# Patient Record
Sex: MALE
[De-identification: names, ages, dates, MRNs, and addresses within clinical notes are randomized per-mention and may not be internally consistent; named-entity substitution may affect disease eponyms.]

---

## 2019-06-20 ENCOUNTER — APPOINTMENT (OUTPATIENT)
Dept: ENDOCRINOLOGY | Facility: CLINIC | Age: 79
End: 2019-06-20
Payer: MEDICARE

## 2019-06-20 VITALS — BODY MASS INDEX: 26.68 KG/M2 | HEIGHT: 67.3 IN | WEIGHT: 172 LBS

## 2019-06-20 PROBLEM — Z00.00 ENCOUNTER FOR PREVENTIVE HEALTH EXAMINATION: Status: ACTIVE | Noted: 2019-06-20

## 2019-06-20 PROCEDURE — 77080 DXA BONE DENSITY AXIAL: CPT | Mod: GA

## 2019-12-27 ENCOUNTER — APPOINTMENT (OUTPATIENT)
Dept: ENDOCRINOLOGY | Facility: CLINIC | Age: 79
End: 2019-12-27
Payer: MEDICARE

## 2019-12-27 VITALS
WEIGHT: 175 LBS | HEART RATE: 74 BPM | OXYGEN SATURATION: 98 % | BODY MASS INDEX: 26.52 KG/M2 | SYSTOLIC BLOOD PRESSURE: 140 MMHG | HEIGHT: 68 IN | DIASTOLIC BLOOD PRESSURE: 80 MMHG

## 2019-12-27 DIAGNOSIS — Z82.49 FAMILY HISTORY OF ISCHEMIC HEART DISEASE AND OTHER DISEASES OF THE CIRCULATORY SYSTEM: ICD-10-CM

## 2019-12-27 DIAGNOSIS — I10 ESSENTIAL (PRIMARY) HYPERTENSION: ICD-10-CM

## 2019-12-27 DIAGNOSIS — Z78.9 OTHER SPECIFIED HEALTH STATUS: ICD-10-CM

## 2019-12-27 DIAGNOSIS — Z85.72 PERSONAL HISTORY OF NON-HODGKIN LYMPHOMAS: ICD-10-CM

## 2019-12-27 PROCEDURE — 99204 OFFICE O/P NEW MOD 45 MIN: CPT

## 2019-12-27 RX ORDER — COLD-HOT PACK
125 MCG EACH MISCELLANEOUS
Refills: 0 | Status: ACTIVE | COMMUNITY

## 2019-12-27 RX ORDER — GINSENG 100 MG
CAPSULE ORAL
Refills: 0 | Status: ACTIVE | COMMUNITY

## 2019-12-27 RX ORDER — ASCORBIC ACID 500 MG
TABLET ORAL
Refills: 0 | Status: ACTIVE | COMMUNITY

## 2019-12-27 RX ORDER — PSYLLIUM HUSK 0.4 G
CAPSULE ORAL
Refills: 0 | Status: ACTIVE | COMMUNITY

## 2019-12-27 RX ORDER — OMEGA-3/DHA/EPA/FISH OIL 300-1000MG
CAPSULE ORAL
Refills: 0 | Status: ACTIVE | COMMUNITY

## 2019-12-27 RX ORDER — DILTIAZEM HYDROCHLORIDE 240 MG/1
240 CAPSULE, COATED, EXTENDED RELEASE ORAL
Refills: 0 | Status: ACTIVE | COMMUNITY

## 2019-12-27 RX ORDER — ASPIRIN 325 MG/1
TABLET, FILM COATED ORAL
Refills: 0 | Status: ACTIVE | COMMUNITY

## 2019-12-27 RX ORDER — VITAMIN E ACID SUCCINATE 268 MG
TABLET ORAL
Refills: 0 | Status: ACTIVE | COMMUNITY

## 2019-12-30 PROBLEM — Z85.72 HISTORY OF NON-HODGKIN'S LYMPHOMA: Status: RESOLVED | Noted: 2019-12-30 | Resolved: 2019-12-30

## 2019-12-30 PROBLEM — Z82.49 FAMILY HISTORY OF ATRIAL FIBRILLATION: Status: ACTIVE | Noted: 2019-12-30

## 2019-12-30 PROBLEM — I10 HYPERTENSION: Status: ACTIVE | Noted: 2019-12-30

## 2019-12-30 PROBLEM — Z78.9 NEVER EXERCISES: Status: ACTIVE | Noted: 2019-12-30

## 2019-12-30 LAB
25(OH)D3 SERPL-MCNC: 70.4 NG/ML
ALBUMIN SERPL ELPH-MCNC: 4.4 G/DL
ALP BLD-CCNC: 79 U/L
ALT SERPL-CCNC: 13 U/L
ANION GAP SERPL CALC-SCNC: 14 MMOL/L
AST SERPL-CCNC: 17 U/L
BILIRUB SERPL-MCNC: 0.4 MG/DL
BUN SERPL-MCNC: 22 MG/DL
CALCIUM SERPL-MCNC: 9.7 MG/DL
CALCIUM SERPL-MCNC: 9.7 MG/DL
CHLORIDE SERPL-SCNC: 104 MMOL/L
CO2 SERPL-SCNC: 25 MMOL/L
CREAT SERPL-MCNC: 1.13 MG/DL
GLUCOSE SERPL-MCNC: 77 MG/DL
PARATHYROID HORMONE INTACT: 27 PG/ML
POTASSIUM SERPL-SCNC: 4.4 MMOL/L
PROT SERPL-MCNC: 6.6 G/DL
SODIUM SERPL-SCNC: 143 MMOL/L
TSH SERPL-ACNC: 2.95 UIU/ML

## 2019-12-30 NOTE — ASSESSMENT
[Bisphosphonate Therapy] : Risks  and benefits of bisphosphonate therapy were  discussed with the patient including gastroesophageal irritation, osteonecrosis of the jaw, and atypical femur fractures, and acute phase reaction [Bisphosphonates] : The patient was instructed to take bisphosphonates on an empty stomach with a full glass of water,and wait at least 30 minutes before eating or lying down [FreeTextEntry1] : Mr. ROLLE is a 79 year old male w/ osteopenia\par \par He was told of osteoporosis in 6/2019. He has not rx in the past. H/o Non-Hodgkins lymphoma in 2014, rx w/ Rituxan. No unusual risks for osteoporosis. Outside BMD 6/2019 indicates osteopenia at sites, Meets criteria for osteoporosis based on increased risk of hip fracture acc to FRAX model. \par \par I recommend the pt start rx due to increased risk for future fx. I discussed and recommend rx with bisphosphonate therapy, including Fosamax, Actonel, Boniva, and IV Reclast. Risks and benefits of bisphosphonate therapy were discussed with the patient including minor aches & pains, gastroesophageal irritation (excluding IV Reclast), osteonecrosis of the jaw, and atypical femur fractures, and acute phase reaction. Pt would benefit from bisphosphonate therapy with the expectation of a drug holiday within 5 years. All questions were answered. Pt understands and agrees to start Actonel. Prescription sent out.\par \par Request labs sent out.\par \par f/u in 6 months

## 2019-12-30 NOTE — PHYSICAL EXAM
[Alert] : alert [No Acute Distress] : no acute distress [Well Nourished] : well nourished [Well Developed] : well developed [Normal Sclera/Conjunctiva] : normal sclera/conjunctiva [EOMI] : extra ocular movement intact [No Proptosis] : no proptosis [Normal Oropharynx] : the oropharynx was normal [Thyroid Not Enlarged] : the thyroid was not enlarged [No Thyroid Nodules] : there were no palpable thyroid nodules [No Respiratory Distress] : no respiratory distress [No Accessory Muscle Use] : no accessory muscle use [Normal Rate] : heart rate was normal  [Clear to Auscultation] : lungs were clear to auscultation bilaterally [Regular Rhythm] : with a regular rhythm [Normal S1, S2] : normal S1 and S2 [No Edema] : there was no peripheral edema [Normal Bowel Sounds] : normal bowel sounds [Not Tender] : non-tender [Soft] : abdomen soft [Post Cervical Nodes] : posterior cervical nodes [Anterior Cervical Nodes] : anterior cervical nodes [Not Distended] : not distended [Axillary Nodes] : axillary nodes [Normal] : normal and non tender [No Spinal Tenderness] : no spinal tenderness [No Stigmata of Cushings Syndrome] : no stigmata of cushings syndrome [Spine Straight] : spine straight [Normal Gait] : normal gait [Normal Strength/Tone] : muscle strength and tone were normal [No Rash] : no rash [Normal Reflexes] : deep tendon reflexes were 2+ and symmetric [No Tremors] : no tremors [Oriented x3] : oriented to person, place, and time [Acanthosis Nigricans] : no acanthosis nigricans [de-identified] : decreased truncal ht

## 2019-12-30 NOTE — HISTORY OF PRESENT ILLNESS
[Vitamin D (supplements)] : Vitamin D as a dietary supplement [Calcium (dietary)] : calcium from their regular diet [FreeTextEntry1] : Mr. ROLLE is a 79 year old male being referred today for low bone density \par \par Pt requested screening BMD , based on age,  6/2019.No h/o fractures of bone disease. He has not rx in the past. H/o Non-Hodgkins lymphoma in 2014, rx w/ Rituxan. H/o 2 cardiac ablations. H/o hernia repair. No h/o prior fx. No h/o kidney stones or calcium problems. No lung, kidney, liver, stomach problems. No neurological or psychological problems. No ulcers or bleeding. No unusual risks for osteoporosis. No h/o RT, no amiodarone or lithium use. No h/o Paget's disease. No routine exercise. Diet includes milk, yogurt, and cheese.\par Bone mineral density: 6/2019\par Lumbar spine 1-4  Not accurate because of arthritis \par Total hip   BMD: 0.693 g/cm2  T score -2.3  WHO Classification osteopenia \par Femoral neck  BMD:0.640 g/cm2  T score -2.1  WHO Classification osteopenia\par Proximal wrist  BMD:0.710 g/cm2  T score -2.0  WHO Classification osteopenia  \par FRAX 9.3/ 3.6%, consistent with osteoporosis

## 2019-12-30 NOTE — CONSULT LETTER
[Dear  ___] : Dear  [unfilled], [Consult Letter:] : I had the pleasure of evaluating your patient, [unfilled]. [Please see my note below.] : Please see my note below. [Consult Closing:] : Thank you very much for allowing me to participate in the care of this patient.  If you have any questions, please do not hesitate to contact me. [Sincerely,] : Sincerely, [FreeTextEntry2] : Jeff Flores MD\par [address]\[place] [address]\par [phone]

## 2019-12-30 NOTE — END OF VISIT
[FreeTextEntry3] : I, Malcolm Guthrie, authored this note working as a medical scribe for Dr. Dye.  12/27/2019.  1:30PM. This note was authored by the medical scribe for me. I have reviewed, edited, and revised the note as needed. I am in agreement with the exam findings, imaging findings, and treatment plan.  Cesar Dye MD

## 2020-12-30 ENCOUNTER — APPOINTMENT (OUTPATIENT)
Dept: ENDOCRINOLOGY | Facility: CLINIC | Age: 80
End: 2020-12-30

## 2021-07-30 ENCOUNTER — APPOINTMENT (OUTPATIENT)
Dept: ENDOCRINOLOGY | Facility: CLINIC | Age: 81
End: 2021-07-30
Payer: MEDICARE

## 2021-07-30 VITALS
OXYGEN SATURATION: 95 % | TEMPERATURE: 98 F | SYSTOLIC BLOOD PRESSURE: 110 MMHG | HEART RATE: 63 BPM | WEIGHT: 174 LBS | DIASTOLIC BLOOD PRESSURE: 80 MMHG | HEIGHT: 67.3 IN | BODY MASS INDEX: 26.99 KG/M2

## 2021-07-30 DIAGNOSIS — M81.0 AGE-RELATED OSTEOPOROSIS W/OUT CURRENT PATHOLOGICAL FRACTURE: ICD-10-CM

## 2021-07-30 PROCEDURE — 77080 DXA BONE DENSITY AXIAL: CPT | Mod: GA

## 2021-07-30 PROCEDURE — ZZZZZ: CPT

## 2021-07-30 PROCEDURE — 99213 OFFICE O/P EST LOW 20 MIN: CPT | Mod: 25

## 2021-07-31 NOTE — END OF VISIT
[FreeTextEntry3] : I, Malcolm Guthrie, authored this note working as a medical scribe for Dr. Dye.  07/30/2021.  2:45PM. This note was authored by the medical scribe for me. I have reviewed, edited, and revised the note as needed. I am in agreement with the exam findings, imaging findings, and treatment plan.  Cesar Dye MD

## 2021-07-31 NOTE — HISTORY OF PRESENT ILLNESS
[Risedronate (Actonel)] : Risedronate [FreeTextEntry1] : No significant interval health changes. No interval surgery, hospitalizations, fractures, or change in medications.\par \par Pt started Actonel 12/2019. Took correctly, tolerated well. No interval fx, no UGI sx, no thigh pain. No aches & pains. No heartburn. Last DDS within past 6 months. No ONJ. Pt stopped Actonel ~3/2020 because he did not renew prescription.\par \par Pt requested screening BMD , based on age,  6/2019.No h/o fractures of bone disease. He has not rx in the past. H/o Non-Hodgkins lymphoma in 2014, rx w/ Rituxan. H/o 2 cardiac ablations. H/o hernia repair. No h/o prior fx. No h/o kidney stones or calcium problems. No lung, kidney, liver, stomach problems. No neurological or psychological problems. No ulcers or bleeding. No unusual risks for osteoporosis. No h/o RT, no amiodarone or lithium use. No h/o Paget's disease. No routine exercise. Diet includes milk, yogurt, and cheese.\par Bone mineral density: 6/2019\par Lumbar spine 1-4  Not accurate because of arthritis \par Total hip   BMD: 0.693 g/cm2  T score -2.3  WHO Classification osteopenia \par Femoral neck  BMD:0.640 g/cm2  T score -2.1  WHO Classification osteopenia\par Proximal wrist  BMD:0.710 g/cm2  T score -2.0  WHO Classification osteopenia  \par FRAX 9.3/ 3.6%, consistent with osteoporosis

## 2021-07-31 NOTE — ASSESSMENT
[Bisphosphonate Therapy] : Risks and benefits of bisphosphonate therapy were  discussed with the patient including gastroesophageal irritation, osteonecrosis of the jaw, and atypical femur fractures, and acute phase reaction [Bisphosphonates] : The patient was instructed to take bisphosphonates on an empty stomach with a full glass of water,and wait at least 30 minutes before eating or lying down [FreeTextEntry1] : Mr. ROLLE is a 81 year old male w/ osteopenia\par \par He was told of osteoporosis in 6/2019. He has not rx in the past. H/o Non-Hodgkins lymphoma in 2014, rx w/ Rituxan. No unusual risks for osteoporosis. Outside BMD 6/2019 indicates osteopenia at sites, Meets criteria for osteoporosis based on increased risk of hip fracture acc to FRAX model 9.3/3.6. Patient advised for an increased risk of future fx. Options for medical therapy discussed in detail. Pt started Actonel 12/2019. Took correctly, tolerated well. No interval fx, no UGI sx, no thigh pain. No aches & pains. No heartburn. No ONJ. Pt stopped Actonel ~3/2020 because he did not renew prescription. BMD 7/2021 indicates stable osteopenia in hip and improved osteopenia in proximal radius. BMD results reviewed w/ pt.\par \par Options of therapy reviewed. Recommend pt restart Actonel as previously he tolerated this rx. Reviewed pt would be treated empirically for ~2-3 years followed by a future drug holiday for < 5 years to decrease risk of long term therapy side effects; ONJ or atypical femur fx. Risks and benefits discussed. All questions were answered. Pt understands and agrees to restart Actonel and to renew rx every 3 months. Medication instructions reviewed. Prescription sent.\par \par Pt will send recent labs to me.\par \par F/u in 1 year

## 2022-07-26 ENCOUNTER — RX RENEWAL (OUTPATIENT)
Age: 82
End: 2022-07-26

## 2022-11-10 ENCOUNTER — RX RENEWAL (OUTPATIENT)
Age: 82
End: 2022-11-10

## 2022-11-20 ENCOUNTER — RX RENEWAL (OUTPATIENT)
Age: 82
End: 2022-11-20

## 2022-11-28 ENCOUNTER — RX RENEWAL (OUTPATIENT)
Age: 82
End: 2022-11-28

## 2023-02-24 ENCOUNTER — RX RENEWAL (OUTPATIENT)
Age: 83
End: 2023-02-24

## 2023-06-12 ENCOUNTER — RX RENEWAL (OUTPATIENT)
Age: 83
End: 2023-06-12

## 2023-06-21 ENCOUNTER — RX RENEWAL (OUTPATIENT)
Age: 83
End: 2023-06-21

## 2023-09-18 ENCOUNTER — RX RENEWAL (OUTPATIENT)
Age: 83
End: 2023-09-18

## 2023-09-18 RX ORDER — RISEDRONATE SODIUM 150 MG/1
150 TABLET, FILM COATED ORAL
Qty: 1 | Refills: 0 | Status: ACTIVE | COMMUNITY
Start: 2019-12-27 | End: 1900-01-01

## 2023-09-19 ENCOUNTER — RX RENEWAL (OUTPATIENT)
Age: 83
End: 2023-09-19

## 2024-10-09 ENCOUNTER — APPOINTMENT (OUTPATIENT)
Dept: ENDOCRINOLOGY | Facility: CLINIC | Age: 84
End: 2024-10-09
Payer: MEDICARE

## 2024-10-09 VITALS — WEIGHT: 159 LBS | BODY MASS INDEX: 25.25 KG/M2 | HEIGHT: 66.4 IN

## 2024-10-09 PROCEDURE — 77080 DXA BONE DENSITY AXIAL: CPT | Mod: GA

## 2024-10-16 ENCOUNTER — APPOINTMENT (OUTPATIENT)
Dept: ENDOCRINOLOGY | Facility: CLINIC | Age: 84
End: 2024-10-16
Payer: MEDICARE

## 2024-10-16 VITALS
HEART RATE: 64 BPM | HEIGHT: 66 IN | SYSTOLIC BLOOD PRESSURE: 142 MMHG | BODY MASS INDEX: 26.68 KG/M2 | DIASTOLIC BLOOD PRESSURE: 80 MMHG | OXYGEN SATURATION: 95 % | WEIGHT: 166 LBS

## 2024-10-16 DIAGNOSIS — M81.0 AGE-RELATED OSTEOPOROSIS W/OUT CURRENT PATHOLOGICAL FRACTURE: ICD-10-CM

## 2024-10-16 PROCEDURE — 99204 OFFICE O/P NEW MOD 45 MIN: CPT
